# Patient Record
Sex: FEMALE | Race: WHITE | NOT HISPANIC OR LATINO | Employment: FULL TIME | URBAN - METROPOLITAN AREA
[De-identification: names, ages, dates, MRNs, and addresses within clinical notes are randomized per-mention and may not be internally consistent; named-entity substitution may affect disease eponyms.]

---

## 2023-10-03 ENCOUNTER — OFFICE VISIT (OUTPATIENT)
Dept: PRIMARY CARE | Facility: CLINIC | Age: 33
End: 2023-10-03
Payer: COMMERCIAL

## 2023-10-03 VITALS
WEIGHT: 118.38 LBS | BODY MASS INDEX: 19.03 KG/M2 | SYSTOLIC BLOOD PRESSURE: 118 MMHG | HEIGHT: 66 IN | HEART RATE: 58 BPM | DIASTOLIC BLOOD PRESSURE: 70 MMHG | OXYGEN SATURATION: 99 %

## 2023-10-03 DIAGNOSIS — R00.0 TACHYCARDIA: Primary | ICD-10-CM

## 2023-10-03 DIAGNOSIS — Z90.5 SOLITARY KIDNEY, ACQUIRED: ICD-10-CM

## 2023-10-03 DIAGNOSIS — M54.32 SCIATICA, LEFT SIDE: ICD-10-CM

## 2023-10-03 PROBLEM — R10.9 ABDOMINAL PAIN: Status: RESOLVED | Noted: 2023-10-03 | Resolved: 2023-10-03

## 2023-10-03 PROBLEM — R10.9 FLANK PAIN: Status: RESOLVED | Noted: 2023-10-03 | Resolved: 2023-10-03

## 2023-10-03 PROBLEM — R53.83 FATIGUE: Status: ACTIVE | Noted: 2023-10-03

## 2023-10-03 PROBLEM — N63.10 LUMP OF BREAST, RIGHT: Status: ACTIVE | Noted: 2023-10-03

## 2023-10-03 PROBLEM — B07.0 PLANTAR WARTS: Status: ACTIVE | Noted: 2023-10-03

## 2023-10-03 PROBLEM — I83.891 VARICOSE VEINS OF RIGHT LOWER EXTREMITY WITH COMPLICATIONS: Status: ACTIVE | Noted: 2023-10-03

## 2023-10-03 PROBLEM — R87.810 CERVICAL HIGH RISK HPV (HUMAN PAPILLOMAVIRUS) TEST POSITIVE: Status: ACTIVE | Noted: 2023-10-03

## 2023-10-03 PROCEDURE — 90686 IIV4 VACC NO PRSV 0.5 ML IM: CPT | Performed by: FAMILY MEDICINE

## 2023-10-03 PROCEDURE — 1036F TOBACCO NON-USER: CPT | Performed by: FAMILY MEDICINE

## 2023-10-03 PROCEDURE — 99203 OFFICE O/P NEW LOW 30 MIN: CPT | Performed by: FAMILY MEDICINE

## 2023-10-03 PROCEDURE — 90471 IMMUNIZATION ADMIN: CPT | Performed by: FAMILY MEDICINE

## 2023-10-03 RX ORDER — CHOLECALCIFEROL (VITAMIN D3) 50 MCG
TABLET ORAL
COMMUNITY
Start: 2021-04-01

## 2023-10-03 RX ORDER — MISOPROSTOL 200 UG/1
TABLET ORAL
COMMUNITY
Start: 2022-04-26 | End: 2023-10-03 | Stop reason: ALTCHOICE

## 2023-10-03 RX ORDER — METOPROLOL SUCCINATE 25 MG/1
25 TABLET, EXTENDED RELEASE ORAL
COMMUNITY
Start: 2023-08-04

## 2023-10-03 RX ORDER — NORETHINDRONE ACETATE AND ETHINYL ESTRADIOL, ETHINYL ESTRADIOL AND FERROUS FUMARATE 1MG-10(24)
1 KIT ORAL DAILY
COMMUNITY
Start: 2019-06-20 | End: 2023-10-03 | Stop reason: ALTCHOICE

## 2023-10-03 ASSESSMENT — PATIENT HEALTH QUESTIONNAIRE - PHQ9
SUM OF ALL RESPONSES TO PHQ9 QUESTIONS 1 AND 2: 0
2. FEELING DOWN, DEPRESSED OR HOPELESS: NOT AT ALL
1. LITTLE INTEREST OR PLEASURE IN DOING THINGS: NOT AT ALL

## 2023-10-03 ASSESSMENT — ENCOUNTER SYMPTOMS
LOSS OF SENSATION IN FEET: 0
VOMITING: 0
DIFFICULTY URINATING: 0
DEPRESSION: 0
BLOOD IN STOOL: 0
LIGHT-HEADEDNESS: 0
WHEEZING: 0
CONFUSION: 0
FEVER: 0
DIARRHEA: 0
WEAKNESS: 0
NUMBNESS: 0
COUGH: 0
DIZZINESS: 0
TROUBLE SWALLOWING: 0
SHORTNESS OF BREATH: 0
OCCASIONAL FEELINGS OF UNSTEADINESS: 0
ABDOMINAL PAIN: 0
NAUSEA: 0
CHILLS: 0
DYSURIA: 0
UNEXPECTED WEIGHT CHANGE: 0

## 2023-10-03 NOTE — PATIENT INSTRUCTIONS
Please return for your next wellness visit in April 2024, earlier if any question or concern, or need to address the sciatica.      For assistance with scheduling referrals or consultations, please call [phone]. For laboratory, radiology, and other tests, please call [phone] ([phone] for pediatrics). Please review prescription inserts and published information for possible adverse effects of all medications. Return after testing or consultation to review results and recommendations, if symptoms persist, change, worsen, or return, or if you have any question or concern. If you do not get results within 7-10 days, or you have any question or concern, please send a message, call the office ([phone]), or return to the office for a follow-up appointment. For non-emergencies, you may call the office. For emergencies, call 9-1-1 or go to the nearest Emergency Department. Please schedule additional appointment(s) to address concern(s) not addressed today.    In general, results are not released or discussed over the telephone. Results of tests done through [facility] are released via  [employer] (see below).  [email or web address]   [employer] support line: [phone]    Until we complete our transition to the new system, additional information can be found at [email or web address] or on your Android or iOS (iPhone, iPad) device using the [employer] archie available free of charge in your device's archie store.

## 2023-10-03 NOTE — PROGRESS NOTES
"Subjective   Patient ID: Lashawn Guillen is a 33 y.o. female who presents for Establish Care (Pt presents bas a new pt to establish care, having sciatica flare being seen by chiropractor, no refills needed.BL).  HPI    C/o sciatica. Being managed by a chiropractor. Denies weakness, numbness, tingling, weakness in both legs, numbness between the legs (in the \"saddle\" area), loss of bowel or bladder control, or a progressively worse neurological symptom (weakness, numbness, tingling, pain, nerve pain).     Labs last done 4-4-2023 with Dr. Alvarez.      Review of Systems   Constitutional:  Negative for chills, fever and unexpected weight change.   HENT:  Negative for ear pain and trouble swallowing.    Respiratory:  Negative for cough, shortness of breath and wheezing.    Cardiovascular:  Negative for chest pain.   Gastrointestinal:  Negative for abdominal pain, blood in stool, diarrhea, nausea and vomiting.   Genitourinary:  Negative for difficulty urinating and dysuria.   Skin:  Negative for rash.   Neurological:  Negative for dizziness, syncope, weakness, light-headedness and numbness.   Psychiatric/Behavioral:  Negative for behavioral problems and confusion.          Objective   /70   Pulse 58   Ht 1.664 m (5' 5.5\")   Wt 53.7 kg (118 lb 6 oz)   SpO2 99%   BMI 19.40 kg/m²     Physical Exam  Vitals and nursing note reviewed.   Constitutional:       General: She is not in acute distress.     Appearance: She is not diaphoretic.   HENT:      Head: Normocephalic and atraumatic.   Cardiovascular:      Rate and Rhythm: Normal rate and regular rhythm.      Heart sounds: Normal heart sounds.   Pulmonary:      Effort: Pulmonary effort is normal.      Breath sounds: Normal breath sounds.   Abdominal:      General: Bowel sounds are normal. There is no distension.      Palpations: Abdomen is soft. There is no mass.      Tenderness: There is no abdominal tenderness. There is no guarding or rebound.   Musculoskeletal: "      Right lower leg: No edema.      Left lower leg: No edema.   Skin:     General: Skin is warm and dry.   Neurological:      General: No focal deficit present.      Mental Status: She is alert. Mental status is at baseline.   Psychiatric:         Mood and Affect: Mood normal.         Thought Content: Thought content normal.         Assessment/Plan   Problem List Items Addressed This Visit       Tachycardia - Primary     POTS? Continue with cardiology.         Solitary kidney, acquired     Good residual function.         Sciatica, left side

## 2024-04-09 ENCOUNTER — OFFICE VISIT (OUTPATIENT)
Dept: PRIMARY CARE | Facility: CLINIC | Age: 34
End: 2024-04-09
Payer: COMMERCIAL

## 2024-04-09 VITALS
HEIGHT: 65 IN | SYSTOLIC BLOOD PRESSURE: 134 MMHG | TEMPERATURE: 98.9 F | DIASTOLIC BLOOD PRESSURE: 82 MMHG | WEIGHT: 121 LBS | BODY MASS INDEX: 20.16 KG/M2 | HEART RATE: 84 BPM | OXYGEN SATURATION: 100 %

## 2024-04-09 DIAGNOSIS — R00.0 TACHYCARDIA: ICD-10-CM

## 2024-04-09 DIAGNOSIS — Z85.528 HISTORY OF WILMS' TUMOR: ICD-10-CM

## 2024-04-09 DIAGNOSIS — G90.A POTS (POSTURAL ORTHOSTATIC TACHYCARDIA SYNDROME): ICD-10-CM

## 2024-04-09 DIAGNOSIS — Z13.1 DIABETES MELLITUS SCREENING: ICD-10-CM

## 2024-04-09 DIAGNOSIS — Z13.220 NEED FOR LIPID SCREENING: ICD-10-CM

## 2024-04-09 DIAGNOSIS — Z00.00 ANNUAL PHYSICAL EXAM: Primary | ICD-10-CM

## 2024-04-09 PROCEDURE — 99385 PREV VISIT NEW AGE 18-39: CPT | Performed by: STUDENT IN AN ORGANIZED HEALTH CARE EDUCATION/TRAINING PROGRAM

## 2024-04-09 PROCEDURE — 1036F TOBACCO NON-USER: CPT | Performed by: STUDENT IN AN ORGANIZED HEALTH CARE EDUCATION/TRAINING PROGRAM

## 2024-04-09 ASSESSMENT — PATIENT HEALTH QUESTIONNAIRE - PHQ9
SUM OF ALL RESPONSES TO PHQ9 QUESTIONS 1 AND 2: 0
1. LITTLE INTEREST OR PLEASURE IN DOING THINGS: NOT AT ALL
2. FEELING DOWN, DEPRESSED OR HOPELESS: NOT AT ALL

## 2024-04-09 NOTE — PROGRESS NOTES
Subjective   Patient ID: Lashawn Guillen is a 33 y.o. female who presents for Establish Care.  Today she is accompanied by alone.     HPI  1. Health Maintenance  Overall patient is doing well.   Immunization:   -Tdap 2011, unsure if she has updated since then  Colon Cancer Screening: No family history  OB/GYN: Sees Dr. 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  Lives with Boyfriend     Hx of wilms tumor  Right nephrectomy, appendix and partial bowel resection    POTs/tachycardia  - follows cardiologist  - currently on metoprolol 25 mg daily    Sciatica L leg  - follows chiropractor  - under control at this time    Current Outpatient Medications on File Prior to Visit   Medication Sig Dispense Refill    cholecalciferol (Vitamin D-3) 50 MCG (2000 UT) tablet Vitamin D 50 MCG (2000 UT) Oral Tablet   Refills: 0        Start : 1-Apr-2021   Active      metoprolol succinate XL (Toprol-XL) 25 mg 24 hr tablet Take 1 tablet (25 mg) by mouth once daily with breakfast.      ZINC-VIT C-PYRIDOXINE, VIT B6, ORAL Use 1 tablet in the mouth or throat once daily.       No current facility-administered medications on file prior to visit.        Allergies   Allergen Reactions    Meperidine Swelling and Unknown    Nitrofurantoin Monohyd/M-Cryst Unknown       Immunization History   Administered Date(s) Administered    Flu vaccine (IIV4), preservative free *Check age/dose* 10/26/2022, 10/03/2023    Moderna SARS-CoV-2 Vaccination 12/30/2020, 01/27/2021, 11/18/2021    Tdap vaccine, age 7 year and older (BOOSTRIX, ADACEL) 07/12/2011         Review of Systems  All pertinent positive symptoms are included in the history of present illness.  All other systems have been reviewed and are negative and noncontributory to this patient's current ailments.     Objective   /82   Pulse 84   Temp 37.2 °C (98.9 °F)   Ht 1.651 m (5'  "5\")   Wt 54.9 kg (121 lb)   SpO2 100%   BMI 20.14 kg/m²     Physical Exam  CONSTITUTIONAL - well nourished, well developed, looks like stated age, in no acute distress, not ill-appearing, and not tired appearing  SKIN - normal skin color and pigmentation, normal skin turgor without rash, lesions, or nodules visualized  HEAD - no trauma, normocephalic  EYES - pupils are equal and reactive to light, extraocular muscles are intact, and normal external exam  ENT - TM's intact, no injection, no signs of infection, uvula midline, normal tongue movement and throat normal, no exudate, nasal passage without discharge and patent  NECK - supple without rigidity, no neck mass was observed,   LUNG - clear to auscultation, no wheezing, no crackles and no rales, good effort  CARDIAC - regular rate and regular rhythm, no skipped beats, no murmur  ABDOMEN - no organomegaly, soft, nontender, nondistended, normal bowel sounds  EXTREMITIES - no edema, no deformities  NEUROLOGICAL - normal gait, normal balance, normal motor, alert, oriented and no focal signs  PSYCHIATRIC - alert, pleasant and cordial, age-appropriate    Assessment/Plan   1. Health maintenance  Overall patient is doing well.    Complete history and physical examination was performed   Lab work was ordered and will notify of test results and make recommendations accordingly  Colon Cancer screening due at age 44  Please attempt to eat a well-balanced diet and exercise regularly    2. Hx of wilms tumor  Will continue to monitor    3, POTs/tachycardia  Continue metoprolol 25 mg daily and following with your cardiologist as scheduled.     Patient seen and discussed with attending physician, Dr Mic Smart DO  PGY 1  Family Medicine  "

## 2024-04-09 NOTE — PROGRESS NOTES
"Subjective   Patient ID: Lashawn Guillen is a 33 y.o. female with past medical history of who presents for Establish Care.  HPI    Review of Systems    Objective     Visit Vitals  /82   Pulse 84   Temp 37.2 °C (98.9 °F)   Ht 1.651 m (5' 5\")   Wt 54.9 kg (121 lb)   SpO2 100%   BMI 20.14 kg/m²   OB Status Implant   Smoking Status Never   BSA 1.59 m²         Physical Exam    Assessment/Plan   Problem List Items Addressed This Visit    None      Patient seen and discussed with attending physician, Dr Mic Garner, DO PGY2  Family Medicine      "

## 2024-04-16 DIAGNOSIS — B00.1 COLD SORE: Primary | ICD-10-CM

## 2024-04-16 RX ORDER — ACYCLOVIR 50 MG/G
1 OINTMENT TOPICAL
Qty: 15 G | Refills: 0 | Status: SHIPPED | OUTPATIENT
Start: 2024-04-16

## 2024-04-20 ENCOUNTER — LAB (OUTPATIENT)
Dept: LAB | Facility: LAB | Age: 34
End: 2024-04-20
Payer: COMMERCIAL

## 2024-04-20 DIAGNOSIS — Z00.00 ANNUAL PHYSICAL EXAM: ICD-10-CM

## 2024-04-20 DIAGNOSIS — Z13.220 NEED FOR LIPID SCREENING: ICD-10-CM

## 2024-04-20 DIAGNOSIS — R00.0 TACHYCARDIA: ICD-10-CM

## 2024-04-20 DIAGNOSIS — Z13.1 DIABETES MELLITUS SCREENING: ICD-10-CM

## 2024-04-20 LAB
ALBUMIN SERPL BCP-MCNC: 4.7 G/DL (ref 3.4–5)
ALP SERPL-CCNC: 55 U/L (ref 33–110)
ALT SERPL W P-5'-P-CCNC: 11 U/L (ref 7–45)
ANION GAP SERPL CALC-SCNC: 14 MMOL/L (ref 10–20)
AST SERPL W P-5'-P-CCNC: 15 U/L (ref 9–39)
BILIRUB SERPL-MCNC: 0.8 MG/DL (ref 0–1.2)
BUN SERPL-MCNC: 18 MG/DL (ref 6–23)
CALCIUM SERPL-MCNC: 9.4 MG/DL (ref 8.6–10.3)
CHLORIDE SERPL-SCNC: 102 MMOL/L (ref 98–107)
CHOLEST SERPL-MCNC: 158 MG/DL (ref 0–199)
CHOLESTEROL/HDL RATIO: 2.4
CO2 SERPL-SCNC: 28 MMOL/L (ref 21–32)
CREAT SERPL-MCNC: 0.75 MG/DL (ref 0.5–1.05)
EGFRCR SERPLBLD CKD-EPI 2021: >90 ML/MIN/1.73M*2
ERYTHROCYTE [DISTWIDTH] IN BLOOD BY AUTOMATED COUNT: 12.5 % (ref 11.5–14.5)
EST. AVERAGE GLUCOSE BLD GHB EST-MCNC: 88 MG/DL
GLUCOSE SERPL-MCNC: 75 MG/DL (ref 74–99)
HBA1C MFR BLD: 4.7 %
HCT VFR BLD AUTO: 45 % (ref 36–46)
HDLC SERPL-MCNC: 66.8 MG/DL
HGB BLD-MCNC: 15.2 G/DL (ref 12–16)
LDLC SERPL CALC-MCNC: 80 MG/DL
MCH RBC QN AUTO: 30.7 PG (ref 26–34)
MCHC RBC AUTO-ENTMCNC: 33.8 G/DL (ref 32–36)
MCV RBC AUTO: 91 FL (ref 80–100)
NON HDL CHOLESTEROL: 91 MG/DL (ref 0–149)
NRBC BLD-RTO: 0 /100 WBCS (ref 0–0)
PLATELET # BLD AUTO: 294 X10*3/UL (ref 150–450)
POTASSIUM SERPL-SCNC: 4.5 MMOL/L (ref 3.5–5.3)
PROT SERPL-MCNC: 6.9 G/DL (ref 6.4–8.2)
RBC # BLD AUTO: 4.95 X10*6/UL (ref 4–5.2)
SODIUM SERPL-SCNC: 139 MMOL/L (ref 136–145)
TRIGL SERPL-MCNC: 56 MG/DL (ref 0–149)
TSH SERPL-ACNC: 2.22 MIU/L (ref 0.44–3.98)
VLDL: 11 MG/DL (ref 0–40)
WBC # BLD AUTO: 6.4 X10*3/UL (ref 4.4–11.3)

## 2024-04-20 PROCEDURE — 85027 COMPLETE CBC AUTOMATED: CPT

## 2024-04-20 PROCEDURE — 84443 ASSAY THYROID STIM HORMONE: CPT

## 2024-04-20 PROCEDURE — 80061 LIPID PANEL: CPT

## 2024-04-20 PROCEDURE — 36415 COLL VENOUS BLD VENIPUNCTURE: CPT

## 2024-04-20 PROCEDURE — 80053 COMPREHEN METABOLIC PANEL: CPT

## 2024-04-20 PROCEDURE — 83036 HEMOGLOBIN GLYCOSYLATED A1C: CPT

## 2024-06-05 ENCOUNTER — OFFICE VISIT (OUTPATIENT)
Dept: OBSTETRICS AND GYNECOLOGY | Facility: CLINIC | Age: 34
End: 2024-06-05
Payer: COMMERCIAL

## 2024-06-05 VITALS
HEIGHT: 65 IN | BODY MASS INDEX: 20.49 KG/M2 | DIASTOLIC BLOOD PRESSURE: 68 MMHG | WEIGHT: 123 LBS | SYSTOLIC BLOOD PRESSURE: 110 MMHG

## 2024-06-05 DIAGNOSIS — Z12.4 CERVICAL CANCER SCREENING: ICD-10-CM

## 2024-06-05 DIAGNOSIS — Z01.419 WELL WOMAN EXAM: Primary | ICD-10-CM

## 2024-06-05 PROCEDURE — 99395 PREV VISIT EST AGE 18-39: CPT | Performed by: OBSTETRICS & GYNECOLOGY

## 2024-06-05 PROCEDURE — 88175 CYTOPATH C/V AUTO FLUID REDO: CPT

## 2024-06-05 PROCEDURE — 87624 HPV HI-RISK TYP POOLED RSLT: CPT

## 2024-06-05 NOTE — PROGRESS NOTES
Subjective   Patient ID: Lashawn Guillen is a 33 y.o. female who presents for Well woman visit.  Review of my note from annual exam 1 year ago.  Ended up having colposcopy for LGSIL positive HPV.       Shivam Dean MD  Physician  Specialty: Obstetrics and Gynecology     Progress Notes     Signed     Encounter Date: 5/10/2023    Signed           Orders     · PAP GYN, Cytology; Status:In Progress - Specimen/Data Collected,Retrospective  Authorization;   Done: 10May2023  Last Menstrual Period (LMP): : Kyleena IUD  PAP - Site : CERVICAL  Cytology Order : ThinPrep PAP, Screening, HPV CoTest - Include Genotyping     · IO UA (nonautomated w/o microscopy); Status:Resulted - Requires  Verification,Retrospective Authorization;   Done: 10May2023 03:31PM     Patient Discussion/Summary     Established patient annual exam 32 years old. 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 Last she had a Pap with positive HPV     Has a Kyleena in place. History Wilms tumor. Pregnancy is contraindicated based on her past history     Dating Baljit Corona who is a PA in the emergency room     Exam breast abdominal pelvic exams normal.     She did have a mammogram at age 29 they found a fibroadenoma.     She does have random intermittent spotting which I reassured her is a common side effect of the Kyleena. Generally leads to much less bleeding and cycles but it can be unpredictable. Kyleena is good till 2027     On exam all normal. Pap smear obtained. Will notify her of any abnormalities on Pap otherwise follow-up 1 year      Chief Complaint     Here for annual exam.      Review of Systems  Constitutional: no fever,~no chills,~no recent weight gain,~no recent weight loss~and~no fatigue.   Eyes: no eye pain,~no vision problems~and~no dryness of the eyes.   ENT: no hearing loss,~no nosebleeds~and~no sinus congestion.   Cardiovascular: no chest pain,~no palpitations~and~no orthopnea.   Respiratory: no shortness of breath,~no cough~and~no  wheezing.   Gastrointestinal: no abdominal pain,~no constipation,~no nausea,~no diarrhea~and~no vomiting.   Genitourinary: no dysuria,~no urinary incontinence,~no vaginal dryness,~no vaginal itching,~no dyspareunia,~no pelvic pain,~no dysmenorrhea,~no sexual problems,~no change in urinary frequency,~no vaginal discharge,~no unexplained vaginal bleeding~and~no lesion/sore.   Musculoskeletal: no back pain,~no joint swelling~and~no leg edema.   Integumentary: no rashes,~no skin lesions,~no nipple discharge,~no breast pain~and~no breast lump.   Neurological: no headache,~no numbness~and~no dizziness.   Psychiatric: no sleep disturbances,~no anxiety~and~no depression.   Endocrine: no hot flashes,~no loss of hair~and~no hirsutism.   Hematologic/Lymphatic: no swollen glands,~no tendency for easy bleeding~and~no tendency for easy bruising.   All other systems have been reviewed and are negative for complaint.      Active Problems  Problems    · Abdominal pain (789.00) (R10.9)   · Breast lump (611.72) (N63.0)   · Cervical high risk HPV (human papillomavirus) test positive (795.05) (R87.810)   · Cervical smear, as part of routine gynecological examination (V76.2) (Z01.419)   · Contraceptive management (V25.9) (Z30.9)   · Encounter for fertility planning (V26.89) (Z31.89)   · Encounter for gynecological examination with abnormal finding (V72.31) (Z01.411)   · Encounter for gynecological examination without abnormal finding (V72.31) (Z01.419)   · Encounter for routine gynecological examination (V72.31) (Z01.419)   2011-normal   · Encounter for surveillance of contraceptive pills (V25.41) (Z30.41)   · Fatigue (780.79) (R53.83)   · Fertility testing (V26.21) (Z31.41)   · Flank pain (789.09) (R10.9)   · Lump of breast, right (611.72) (N63.10)   · Plantar warts (078.12) (B07.0)   · Varicose veins of right lower extremity with complications (454.8) (I83.891)   · Visit for insertion of intrauterine device (V25.11) (Z30.430)     Past  Medical History  Problems    · 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)     Surgical History  Problems    · History of Appendectomy   · History of Biopsy Skin   · benign   · History of Colon Surgery   1.5 feet removed due to adhesions   · History of Eye Surgery   teadr duct blocked   · History of Kidney Surgery   had a wilms tumor on right kidney, right kidney was removed      Family History  Mother    · Family history of Fibrocystic Disease Of Breast   · Family history of Osteoarthritis (V17.7)  Father    · Family history of Rheumatoid Arthritis  Paternal Grandmother    · Family history of Brain Cancer (V16.8)  Maternal Grandfather    · Family history of Osteoarthritis (V17.7)  Paternal Grandfather    · Family history of Diabetes Mellitus (V18.0)     Social History  Problems    · Always uses seat belt   · Being A Social Drinker   · Marital History - Currently    · Never a smoker   · Work History   [employer] in [place]     Allergies  Medication    · Demerol SOLN   Allergy; Swelling; 1993; Updated By: Kathy Gonzalez; 2013 10:39:06 AM  swelling all over   · Macrobid CAPS   Recorded By: Talisha Chavez; 2019 12:40:21 PM     Current Meds       Medication Name Instruction  Lo Loestrin Fe 1 MG-10 MCG / 10 MCG Oral Tablet TAKE 1 TABLET DAILY AS DIRECTED.  miSOPROStol 200 MCG Oral Tablet 200 mcg cytotec po night before procedure  Vitamin C Plus 500 MG TABS    Vitamin D 50 MCG (2000) Oral Tablet       Vitals  Vital Signs       Recorded: [id number] 03:31PM  Systolic 122  Diastolic 78  Height 5 ft 5 in  Weight 117 lb   BMI Calculated 19.47 kg/m2  BSA Calculated 1.58  LMP Kyleena iud   0     Physical Exam     Constitutional: Alert and in no acute distress. Well developed, well nourished   Head and Face: Head and face: normal   Eyes: Normal external exam - nonicteric sclera, extraocular movements intact (EOMI) and no ptosis.   Ears, Nose, Mouth, and Throat: External inspection of  ears and nose: normal   Neck: no neck asymmetry. Supple~and~thyroid not enlarged and there were no palpable thyroid nodules   Cardiovascular: Heart rate and rhythm were normal, normal S1 and S2, no gallops, and no murmurs   Pulmonary: No respiratory distress~and~clear bilateral breath sounds   Chest: Breasts: normal appearance, no nipple discharge and no skin changes~and~palpation of breasts and axillae: no palpable mass and no axillary lymphadenopathy   Abdomen: soft nontender; no abdominal mass palpated,~no organomegaly~and~no hernias   Genitourinary: external genitalia: normal,~no inguinal lymphadenopathy,~Bartholin's urethral and Elco's glands: normal,~urethra: normal,~bladder: normal on palpation~and~perianal area: normal   Vagina: normal.   Cervix: Normal.   Uterus: Normal.   Right Adnexa/parametria: Normal.   Left Adnexa/parametria: Normal.   Musculoskeletal: no joint swelling seen, normal movements of all extremities   Skin: normal skin color and pigmentation, normal skin turgor, and no rash.   Neurologic: cranial nerves: non-focal. grossly intact   Psychiatric: alert and oriented x 3.,~affect normal to patient baseline~and~mood: appropriate      Results/Data    IO UA (nonautomated w/o microscopy) 10May2023 03:31PM Shivam Dean       Test Name Result Flag Reference  IO Glucose - Urine Negative      IO Blood Negative      IO Protein, Urine Negative      IO Nitrite, Urine Negative      IO Leukocytes Negative      IO Glucose - Urine Negative      IO Blood Negative      IO Protein, Urine Negative      IO Nitrite, Urine Negative      IO Leukocytes Negative                     Signatures   Electronically signed by : Shivam Dean MD; May 10 2023  3:49PM EST (Author)               Last signed by: Shivam Dean MD at 5/10/2023  3:49 PM    Established patient annual exam.  33 years old.  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  [place] till 2027.  Occasional  minimal spotting.  Last year she had an abnormal Pap we reviewed it.    Relationship status is unchanged.  Her [place] been dating for 4-1/2 years.    Exam breast abdominal pelvic exams normal.  Pap smear obtained.  IUD thread seen    Well woman exam.  Will triage based on Pap smear.  Otherwise follow-up 1 year        Review of Systems   All other systems reviewed and are negative.      Objective   Physical Exam  Constitutional:       Appearance: Normal appearance. She is normal weight.   Chest:   Breasts:     Right: Normal.      Left: Normal.   Genitourinary:     General: Normal vulva.      Vagina: Normal.      Cervix: Normal.      Uterus: Normal.       Adnexa: Right adnexa normal and left adnexa normal.   Neurological:      Mental Status: She is alert.         Assessment/Plan   Well woman exam.  Pap smear obtained.  History LGSIL Pap.  Kyleena good till 2027.  Triage based on Pap results.  Otherwise follow-up 1 year         Shivam Dean MD 06/05/24 3:58 PM

## 2024-06-17 LAB
CYTOLOGY CMNT CVX/VAG CYTO-IMP: NORMAL
HPV HR 12 DNA GENITAL QL NAA+PROBE: POSITIVE
HPV HR GENOTYPES PNL CVX NAA+PROBE: POSITIVE
HPV16 DNA SPEC QL NAA+PROBE: NEGATIVE
HPV18 DNA SPEC QL NAA+PROBE: NEGATIVE
LAB AP CONTRACEPTIVE HISTORY: NORMAL
LAB AP HPV GENOTYPE QUESTION: YES
LAB AP HPV HR: NORMAL
LABORATORY COMMENT REPORT: NORMAL
LMP START DATE: NORMAL
PATH REPORT.TOTAL CANCER: NORMAL

## 2024-06-21 ENCOUNTER — PROCEDURE VISIT (OUTPATIENT)
Dept: OBSTETRICS AND GYNECOLOGY | Facility: CLINIC | Age: 34
End: 2024-06-21
Payer: COMMERCIAL

## 2024-06-21 VITALS
SYSTOLIC BLOOD PRESSURE: 112 MMHG | DIASTOLIC BLOOD PRESSURE: 62 MMHG | WEIGHT: 122 LBS | BODY MASS INDEX: 20.33 KG/M2 | HEIGHT: 65 IN

## 2024-06-21 DIAGNOSIS — R87.612 LGSIL ON PAP SMEAR OF CERVIX: Primary | ICD-10-CM

## 2024-06-21 PROCEDURE — 88142 CYTOPATH C/V THIN LAYER: CPT

## 2024-06-21 PROCEDURE — 57456 ENDOCERV CURETTAGE W/SCOPE: CPT | Performed by: OBSTETRICS & GYNECOLOGY

## 2024-06-21 PROCEDURE — 87624 HPV HI-RISK TYP POOLED RSLT: CPT

## 2024-06-21 NOTE — PROGRESS NOTES
Patient ID: Lashawn Guillen is a 33 y.o. female.    Colposcopy    Date/Time: 6/21/2024 9:38 AM    Performed by: Shivam Dean MD  Authorized by: Shivam Dean MD    Consent:     Patient questions answered: yes      Risks and benefits of the procedure and its alternatives discussed: yes      Consent obtained:  Verbal    Consent given by:  Patient  Pre-procedure:     Prep solution(s): acetic acid    Procedure:     Colposcopy with: endocervical curettage      Cervix visibility: fully visualized      SCJ visibility: fully visualized    Post-procedure:     Patient tolerance of procedure:  Patient tolerated the procedure well with no immediate complications  Comments:      Recently been told she is positive for a gene that puts her at risk for cardiomyopathy.  Her mother has left ventricular dysfunction/compaction.  Patient has 1 kidney.  Somewhat emotional today with all these medical issues.  Reassurance given

## 2024-07-01 LAB
CYTOLOGY CMNT CVX/VAG CYTO-IMP: NORMAL
HPV HR 12 DNA GENITAL QL NAA+PROBE: POSITIVE
HPV HR GENOTYPES PNL CVX NAA+PROBE: POSITIVE
HPV16 DNA SPEC QL NAA+PROBE: NEGATIVE
HPV18 DNA SPEC QL NAA+PROBE: NEGATIVE
LAB AP CONTRACEPTIVE HISTORY: NORMAL
LAB AP HPV GENOTYPE QUESTION: YES
LAB AP HPV HR: NORMAL
LABORATORY COMMENT REPORT: NORMAL
LABORATORY COMMENT REPORT: NORMAL
LMP START DATE: NORMAL
PATH REPORT.TOTAL CANCER: NORMAL

## 2025-04-15 ENCOUNTER — APPOINTMENT (OUTPATIENT)
Dept: PRIMARY CARE | Facility: CLINIC | Age: 35
End: 2025-04-15
Payer: COMMERCIAL

## 2025-04-15 VITALS
SYSTOLIC BLOOD PRESSURE: 102 MMHG | BODY MASS INDEX: 21.16 KG/M2 | HEART RATE: 80 BPM | HEIGHT: 65 IN | WEIGHT: 127 LBS | OXYGEN SATURATION: 96 % | DIASTOLIC BLOOD PRESSURE: 72 MMHG

## 2025-04-15 DIAGNOSIS — Z15.89 GENETIC SUSCEPTIBILITY TO CARDIOMYOPATHY: ICD-10-CM

## 2025-04-15 DIAGNOSIS — Z00.00 ANNUAL WELLNESS VISIT: Primary | ICD-10-CM

## 2025-04-15 DIAGNOSIS — G90.A POTS (POSTURAL ORTHOSTATIC TACHYCARDIA SYNDROME): ICD-10-CM

## 2025-04-15 PROCEDURE — 1036F TOBACCO NON-USER: CPT | Performed by: STUDENT IN AN ORGANIZED HEALTH CARE EDUCATION/TRAINING PROGRAM

## 2025-04-15 PROCEDURE — 99395 PREV VISIT EST AGE 18-39: CPT | Performed by: STUDENT IN AN ORGANIZED HEALTH CARE EDUCATION/TRAINING PROGRAM

## 2025-04-15 PROCEDURE — 3008F BODY MASS INDEX DOCD: CPT | Performed by: STUDENT IN AN ORGANIZED HEALTH CARE EDUCATION/TRAINING PROGRAM

## 2025-04-15 ASSESSMENT — PATIENT HEALTH QUESTIONNAIRE - PHQ9
1. LITTLE INTEREST OR PLEASURE IN DOING THINGS: NOT AT ALL
SUM OF ALL RESPONSES TO PHQ9 QUESTIONS 1 AND 2: 0
2. FEELING DOWN, DEPRESSED OR HOPELESS: NOT AT ALL

## 2025-04-15 NOTE — PROGRESS NOTES
"Subjective   Lashawn Guillen is a 34 y.o. female who presents for annual health maintenance.    HPI  Health Maintenance:   Pap: last done June 2024 with a colposcopy. She has HPV and follows with OB/GYN.  Mammogram: last done 2021, negative. Repeat in 5 years.  Colonoscopy: not yet indicated. No family history of colon cancer.  Immunizations: Due for tdap. Does not want today.  Sees dentist regularly and not eye doctor.  Diet: home cooked meals.   Exercise: 5 days/week - weights and cardio.  EtOH use: socially.  Non-smoker, never.  Denies other drug or substance use.  Does not need any refills today.    POTS  Takes metoprolol succinate 25 mg daily. Tolerates well and denies any adverse effects.  Her main symptoms are black outs if she stands up too quickly, fatiguing quickly, and having a difficult time catching her breath.  Follows with cardiology, Dr. Lukas Horton.    Family history of dilated LV non-compaction cardiomyopathy; MYH7 gene (+)  Follows with cardiology, Dr. Lukas Horton. Stays active and eats a healthy balanced diet.    ROS:   All pertinent positive symptoms are included in the history of present illness.  All other systems have been reviewed and are negative and noncontributory to this patient's current ailments.    Objective     /72   Pulse 80   Ht 1.651 m (5' 5\")   Wt 57.6 kg (127 lb)   SpO2 96%   BMI 21.13 kg/m²     Physical Exam   CONSTITUTIONAL - well nourished, well developed, looks like stated age, in no acute distress, not ill-appearing, and not tired appearing  SKIN - normal skin color and pigmentation, normal skin turgor without rash, lesions, or nodules visualized  HEAD - no trauma, normocephalic  EYES - extraocular muscles are intact, and normal external exam  ENT - TM's intact, no injection, no signs of infection, uvula midline, normal tongue movement and throat normal, no exudate  NECK - supple without rigidity, no neck mass was observed, no thyromegaly " or thyroid nodules  CHEST - clear to auscultation, no wheezing, no crackles and no rales, good effort  CARDIAC - regular rate and regular rhythm, no skipped beats, no murmur  ABDOMEN - no organomegaly, soft, nontender, nondistended, normal bowel sounds, no guarding/rebound/rigidity, negative McBurney sign and negative Oconnell sign  EXTREMITIES - no obvious or evident edema, no obvious or evident deformities  NEUROLOGICAL - normal gait, normal balance, normal motor, no ataxia, alert, oriented and no focal signs  PSYCHIATRIC - alert, pleasant and cordial, age-appropriate  IMMUNOLOGIC - no cervical lymphadenopathy    Assessment/Plan   Diagnoses and all orders for this visit:  Annual wellness visit  Comments:  Complete physical exam performed  Discussed and recommended age-appropriate vaccinations and screenings  Annual labs ordered  Orders:  -     CBC and Auto Differential; Future  -     Comprehensive Metabolic Panel; Future  -     TSH with reflex to Free T4 if abnormal; Future  -     Lipid Panel; Future  -     Hemoglobin A1C; Future  POTS (postural orthostatic tachycardia syndrome)  Comments:  Stable on metoprolol   Continue to follow with cardiology  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

## 2025-06-13 ENCOUNTER — APPOINTMENT (OUTPATIENT)
Dept: OBSTETRICS AND GYNECOLOGY | Facility: CLINIC | Age: 35
End: 2025-06-13
Payer: COMMERCIAL

## 2025-07-02 ENCOUNTER — APPOINTMENT (OUTPATIENT)
Dept: OBSTETRICS AND GYNECOLOGY | Facility: CLINIC | Age: 35
End: 2025-07-02
Payer: COMMERCIAL

## 2025-07-02 VITALS
WEIGHT: 125 LBS | HEIGHT: 65 IN | SYSTOLIC BLOOD PRESSURE: 124 MMHG | BODY MASS INDEX: 20.83 KG/M2 | DIASTOLIC BLOOD PRESSURE: 60 MMHG

## 2025-07-02 DIAGNOSIS — Z01.419 WELL WOMAN EXAM: ICD-10-CM

## 2025-07-02 PROCEDURE — 99395 PREV VISIT EST AGE 18-39: CPT | Performed by: OBSTETRICS & GYNECOLOGY

## 2025-07-02 PROCEDURE — 3008F BODY MASS INDEX DOCD: CPT | Performed by: OBSTETRICS & GYNECOLOGY

## 2025-07-02 PROCEDURE — 87626 HPV SEP HI-RSK TYP&POOL RSLT: CPT

## 2025-07-02 PROCEDURE — 88141 CYTOPATH C/V INTERPRET: CPT | Performed by: PATHOLOGY

## 2025-07-02 PROCEDURE — 88175 CYTOPATH C/V AUTO FLUID REDO: CPT

## 2025-07-02 NOTE — PROGRESS NOTES
Subjective   Patient ID: Lashawn Guillen is a 34 y.o. female who presents for well woman exam.  Here for annual exam, review of last year's note.  She had a colposcopy after an abnormal Pap.       Shivam Dean MD  Physician  Obstetrics     Progress Notes     Signed     Encounter Date: 6/5/2024    Signed     Expand All Collapse All    Subjective  Patient ID: Lashawn Guillen is a 33 y.o. female who presents for Well woman visit.  Review of my note from annual exam 1 year ago.  Ended up having colposcopy for LGSIL positive HPV.        Shivam Dean MD  Physician  Specialty: Obstetrics and Gynecology     Progress Notes     Signed     Encounter Date: 5/10/2023     Signed             Orders     · PAP GYN, Cytology; Status:In Progress - Specimen/Data Collected,Retrospective  Authorization;   Done: 10May2023  Last Menstrual Period (LMP): : Kyleena IUD  PAP - Site : CERVICAL  Cytology Order : ThinPrep PAP, Screening, HPV CoTest - Include Genotyping     · IO UA (nonautomated w/o microscopy); Status:Resulted - Requires  Verification,Retrospective Authorization;   Done: 10May2023 03:31PM     Patient Discussion/Summary     Established patient annual exam 32 years old. 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 Last she had a Pap with positive HPV     Has a Kyleena in place. History Wilms tumor. Pregnancy is contraindicated based on her past history     Dating Baljit Corona who is a PA in the emergency room     Exam breast abdominal pelvic exams normal.     She did have a mammogram at age 29 they found a fibroadenoma.     She does have random intermittent spotting which I reassured her is a common side effect of the Kyleena. Generally leads to much less bleeding and cycles but it can be unpredictable. Kyleena is good till 2027     On exam all normal. Pap smear obtained. Will notify her of any abnormalities on Pap otherwise follow-up 1 year      Chief Complaint     Here for annual exam.      Review of  Systems  Constitutional: no fever,~no chills,~no recent weight gain,~no recent weight loss~and~no fatigue.   Eyes: no eye pain,~no vision problems~and~no dryness of the eyes.   ENT: no hearing loss,~no nosebleeds~and~no sinus congestion.   Cardiovascular: no chest pain,~no palpitations~and~no orthopnea.   Respiratory: no shortness of breath,~no cough~and~no wheezing.   Gastrointestinal: no abdominal pain,~no constipation,~no nausea,~no diarrhea~and~no vomiting.   Genitourinary: no dysuria,~no urinary incontinence,~no vaginal dryness,~no vaginal itching,~no dyspareunia,~no pelvic pain,~no dysmenorrhea,~no sexual problems,~no change in urinary frequency,~no vaginal discharge,~no unexplained vaginal bleeding~and~no lesion/sore.   Musculoskeletal: no back pain,~no joint swelling~and~no leg edema.   Integumentary: no rashes,~no skin lesions,~no nipple discharge,~no breast pain~and~no breast lump.   Neurological: no headache,~no numbness~and~no dizziness.   Psychiatric: no sleep disturbances,~no anxiety~and~no depression.   Endocrine: no hot flashes,~no loss of hair~and~no hirsutism.   Hematologic/Lymphatic: no swollen glands,~no tendency for easy bleeding~and~no tendency for easy bruising.   All other systems have been reviewed and are negative for complaint.      Active Problems  Problems    · Abdominal pain (789.00) (R10.9)   · Breast lump (611.72) (N63.0)   · Cervical high risk HPV (human papillomavirus) test positive (795.05) (R87.810)   · Cervical smear, as part of routine gynecological examination (V76.2) (Z01.419)   · Contraceptive management (V25.9) (Z30.9)   · Encounter for fertility planning (V26.89) (Z31.89)   · Encounter for gynecological examination with abnormal finding (V72.31) (Z01.411)   · Encounter for gynecological examination without abnormal finding (V72.31) (Z01.419)   · Encounter for routine gynecological examination (V72.31) (Z01.419)   2011-normal   · Encounter for surveillance of contraceptive  pills (V25.41) (Z30.41)   · Fatigue (780.79) (R53.83)   · Fertility testing (V26.21) (Z31.41)   · Flank pain (789.09) (R10.9)   · Lump of breast, right (611.72) (N63.10)   · Plantar warts (078.12) (B07.0)   · Varicose veins of right lower extremity with complications (454.8) (I83.891)   · Visit for insertion of intrauterine device (V25.11) (Z30.430)     Past Medical History  Problems    · 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)     Surgical History  Problems    · History of Appendectomy   · History of Biopsy Skin   · benign   · History of Colon Surgery   1.5 feet removed due to adhesions   · History of Eye Surgery   teadr duct blocked   · History of Kidney Surgery   had a wilms tumor on right kidney, right kidney was removed 1992     Family History  Mother    · Family history of Fibrocystic Disease Of Breast   · Family history of Osteoarthritis (V17.7)  Father    · Family history of Rheumatoid Arthritis  Paternal Grandmother    · Family history of Brain Cancer (V16.8)  Maternal Grandfather    · Family history of Osteoarthritis (V17.7)  Paternal Grandfather    · Family history of Diabetes Mellitus (V18.0)     Social History  Problems    · Always uses seat belt   · Being A Social Drinker   · Marital History - Currently    · Never a smoker   · Work History   [employer] in [place]     Allergies  Medication    · Demerol SOLN   Allergy; Swelling; 01 Jan 1993; Updated By: Kathy Gonzalez; 8/9/2013 10:39:06 AM  swelling all over   · Macrobid CAPS   Recorded By: Talisha Chavez; 12/14/2019 12:40:21 PM     Current Meds        Medication NameInstruction  Lo Loestrin Fe 1 MG-10 MCG / 10 MCG Oral TabletTAKE 1 TABLET DAILY AS DIRECTED.  miSOPROStol 200 MCG Oral Vpxtcf707 mcg cytotec po night before procedure  Vitamin C Plus 500 MG TABS     Vitamin D 50 MCG (2000 UT) Oral Tablet             Vitals  Vital Signs                  Recorded: 10May2023 03:31PM  [id number]  [id number]  Height5 ft 5 in  Weight 117 lb    BMI Ileinjtarz24.47 kg/m2  BSA Calculated1.58  LMPKyleena iud  Gravida0     Physical Exam     Constitutional: Alert and in no acute distress. Well developed, well nourished   Head and Face: Head and face: normal   Eyes: Normal external exam - nonicteric sclera, extraocular movements intact (EOMI) and no ptosis.   Ears, Nose, Mouth, and Throat: External inspection of ears and nose: normal   Neck: no neck asymmetry. Supple~and~thyroid not enlarged and there were no palpable thyroid nodules   Cardiovascular: Heart rate and rhythm were normal, normal S1 and S2, no gallops, and no murmurs   Pulmonary: No respiratory distress~and~clear bilateral breath sounds   Chest: Breasts: normal appearance, no nipple discharge and no skin changes~and~palpation of breasts and axillae: no palpable mass and no axillary lymphadenopathy   Abdomen: soft nontender; no abdominal mass palpated,~no organomegaly~and~no hernias   Genitourinary: external genitalia: normal,~no inguinal lymphadenopathy,~Bartholin's urethral and [place]'s glands: normal,~urethra: normal,~bladder: normal on palpation~and~perianal area: normal   Vagina: normal.   Cervix: Normal.   Uterus: Normal.   Right Adnexa/parametria: Normal.   Left Adnexa/parametria: Normal.   Musculoskeletal: no joint swelling seen, normal movements of all extremities   Skin: normal skin color and pigmentation, normal skin turgor, and no rash.   Neurologic: cranial nerves: non-focal. grossly intact   Psychiatric: alert and oriented x 3.,~affect normal to patient baseline~and~mood: appropriate      Results/Data     IO UA (nonautomated w/o microscopy)[id number] 03:31PShivam Bai        Test NameResultFlagReference  IO Glucose - Urine       Negative                         IO Blood          Negative                         IO Protein, Urine          Negative                         IO Nitrite, Urine           Negative                         IO Leukocytes  Negative                         IO  Glucose - Urine       Negative                         IO Blood          Negative                         IO Protein, Urine          Negative                         IO Nitrite, Urine           Negative                         IO Leukocytes  Negative                                                                      Signatures   Electronically signed by : Shivam Dean MD; May 10 2023  3:49PM EST (Author)                    Last signed by: Shivam Dean MD at 5/10/2023  3:49 PM     Established patient annual exam.  33 years old.  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  Anne good till 2027.  Occasional minimal spotting.  Last year she had an abnormal Pap we reviewed it.     Relationship status is unchanged.  Her [place] been dating for 4-1/2 years.     Exam breast abdominal pelvic exams normal.  Pap smear obtained.  IUD thread seen     Well woman exam.  Will triage based on Pap smear.  Otherwise follow-up 1 year           Review of Systems   All other systems reviewed and are negative.        Objective  Physical Exam  Constitutional:       Appearance: Normal appearance. She is normal weight.   Chest:   Breasts:     Right: Normal.      Left: Normal.   Genitourinary:     General: Normal vulva.      Vagina: Normal.      Cervix: Normal.      Uterus: Normal.       Adnexa: Right adnexa normal and left adnexa normal.   Neurological:      Mental Status: She is alert.            Assessment/Plan  Well woman exam.  Pap smear obtained.  History LGSIL Pap.  Kyleena good till 2027.  Triage based on Pap results.  Otherwise follow-up 1 year        Shivam Dean MD 06/05/24 3:58 PM         Electronically signed by Shivam Dean MD at 6/5/2024  4:05 PM    Here for annual exam.  Working proton therapy at .  She is in better shape than she was a year ago when she had some genetic testing.  Since then she is received a cardiac echo.  It was very reassuring next cardiac echo with   Adal bobby in 5 years    She does have a Kyleena in place light irregular bleeding.  Kyleena good till 2027.  Does have a history of mildly abnormal Paps that we have done colposcopy.  Pap smear was obtained today    On exam slim  female breast abdominal pelvic exams normal IUD thread seen Pap smear obtained.  Reassured her of that if Pap is abnormal and we call her for colposcopy it only needs treatment if there is high-grade changes          Review of Systems   All other systems reviewed and are negative.      Objective   Physical Exam  Constitutional:       Appearance: Normal appearance. She is normal weight.   Neurological:      Mental Status: She is alert.         Assessment/Plan   Well woman exam.  Kyleena good till 2027.  Not planning children.  History of mildly abnormal Paps Pap obtained.  Will triage based on results.  Start mammography at age 39         Shivam Dean MD 07/02/25 3:45 PM

## 2025-07-18 LAB
CYTOLOGY CMNT CVX/VAG CYTO-IMP: NORMAL
HPV HR 12 DNA GENITAL QL NAA+PROBE: POSITIVE
HPV HR GENOTYPES PNL CVX NAA+PROBE: POSITIVE
HPV16 DNA SPEC QL NAA+PROBE: NEGATIVE
HPV18 DNA SPEC QL NAA+PROBE: NEGATIVE
LAB AP CONTRACEPTIVE HISTORY: NORMAL
LAB AP HPV GENOTYPE QUESTION: YES
LAB AP HPV HR: NORMAL
LABORATORY COMMENT REPORT: NORMAL
PATH REPORT.TOTAL CANCER: NORMAL

## 2025-08-22 ENCOUNTER — HOSPITAL ENCOUNTER (EMERGENCY)
Facility: HOSPITAL | Age: 35
Discharge: HOME | End: 2025-08-22
Attending: STUDENT IN AN ORGANIZED HEALTH CARE EDUCATION/TRAINING PROGRAM
Payer: COMMERCIAL

## 2025-08-22 ENCOUNTER — APPOINTMENT (OUTPATIENT)
Dept: RADIOLOGY | Facility: HOSPITAL | Age: 35
End: 2025-08-22
Payer: COMMERCIAL

## 2025-08-22 VITALS
HEART RATE: 91 BPM | TEMPERATURE: 99.9 F | RESPIRATION RATE: 16 BRPM | OXYGEN SATURATION: 96 % | WEIGHT: 127 LBS | HEIGHT: 65 IN | DIASTOLIC BLOOD PRESSURE: 59 MMHG | BODY MASS INDEX: 21.16 KG/M2 | SYSTOLIC BLOOD PRESSURE: 120 MMHG

## 2025-08-22 DIAGNOSIS — R10.13 EPIGASTRIC PAIN: Primary | ICD-10-CM

## 2025-08-22 LAB
ALBUMIN SERPL BCP-MCNC: 4.6 G/DL (ref 3.4–5)
ALP SERPL-CCNC: 45 U/L (ref 33–110)
ALT SERPL W P-5'-P-CCNC: 11 U/L (ref 7–45)
ANION GAP SERPL CALC-SCNC: 13 MMOL/L (ref 10–20)
APPEARANCE UR: CLEAR
AST SERPL W P-5'-P-CCNC: 14 U/L (ref 9–39)
BACTERIA #/AREA URNS AUTO: ABNORMAL /HPF
BASOPHILS # BLD AUTO: 0.05 X10*3/UL (ref 0–0.1)
BASOPHILS NFR BLD AUTO: 0.7 %
BILIRUB SERPL-MCNC: 0.4 MG/DL (ref 0–1.2)
BILIRUB UR STRIP.AUTO-MCNC: NEGATIVE MG/DL
BUN SERPL-MCNC: 17 MG/DL (ref 6–23)
CALCIUM SERPL-MCNC: 9.4 MG/DL (ref 8.6–10.3)
CHLORIDE SERPL-SCNC: 104 MMOL/L (ref 98–107)
CO2 SERPL-SCNC: 25 MMOL/L (ref 21–32)
COLOR UR: COLORLESS
CREAT SERPL-MCNC: 0.85 MG/DL (ref 0.5–1.05)
EGFRCR SERPLBLD CKD-EPI 2021: >90 ML/MIN/1.73M*2
EOSINOPHIL # BLD AUTO: 0.28 X10*3/UL (ref 0–0.7)
EOSINOPHIL NFR BLD AUTO: 4 %
ERYTHROCYTE [DISTWIDTH] IN BLOOD BY AUTOMATED COUNT: 12.5 % (ref 11.5–14.5)
GLUCOSE SERPL-MCNC: 98 MG/DL (ref 74–99)
GLUCOSE UR STRIP.AUTO-MCNC: NORMAL MG/DL
HCG UR QL IA.RAPID: NEGATIVE
HCT VFR BLD AUTO: 40.8 % (ref 36–46)
HGB BLD-MCNC: 14.3 G/DL (ref 12–16)
IMM GRANULOCYTES # BLD AUTO: 0.02 X10*3/UL (ref 0–0.7)
IMM GRANULOCYTES NFR BLD AUTO: 0.3 % (ref 0–0.9)
KETONES UR STRIP.AUTO-MCNC: NEGATIVE MG/DL
LEUKOCYTE ESTERASE UR QL STRIP.AUTO: ABNORMAL
LIPASE SERPL-CCNC: 42 U/L (ref 9–82)
LYMPHOCYTES # BLD AUTO: 1.79 X10*3/UL (ref 1.2–4.8)
LYMPHOCYTES NFR BLD AUTO: 25.5 %
MCH RBC QN AUTO: 31.2 PG (ref 26–34)
MCHC RBC AUTO-ENTMCNC: 35 G/DL (ref 32–36)
MCV RBC AUTO: 89 FL (ref 80–100)
MONOCYTES # BLD AUTO: 0.45 X10*3/UL (ref 0.1–1)
MONOCYTES NFR BLD AUTO: 6.4 %
NEUTROPHILS # BLD AUTO: 4.42 X10*3/UL (ref 1.2–7.7)
NEUTROPHILS NFR BLD AUTO: 63.1 %
NITRITE UR QL STRIP.AUTO: NEGATIVE
NRBC BLD-RTO: 0 /100 WBCS (ref 0–0)
PH UR STRIP.AUTO: 6 [PH]
PLATELET # BLD AUTO: 245 X10*3/UL (ref 150–450)
POTASSIUM SERPL-SCNC: 4.3 MMOL/L (ref 3.5–5.3)
PROT SERPL-MCNC: 6.8 G/DL (ref 6.4–8.2)
PROT UR STRIP.AUTO-MCNC: NEGATIVE MG/DL
RBC # BLD AUTO: 4.58 X10*6/UL (ref 4–5.2)
RBC # UR STRIP.AUTO: ABNORMAL MG/DL
RBC #/AREA URNS AUTO: ABNORMAL /HPF
SODIUM SERPL-SCNC: 138 MMOL/L (ref 136–145)
SP GR UR STRIP.AUTO: 1.01
UROBILINOGEN UR STRIP.AUTO-MCNC: NORMAL MG/DL
WBC # BLD AUTO: 7 X10*3/UL (ref 4.4–11.3)
WBC #/AREA URNS AUTO: ABNORMAL /HPF

## 2025-08-22 PROCEDURE — 71275 CT ANGIOGRAPHY CHEST: CPT | Performed by: RADIOLOGY

## 2025-08-22 PROCEDURE — 83690 ASSAY OF LIPASE: CPT | Performed by: STUDENT IN AN ORGANIZED HEALTH CARE EDUCATION/TRAINING PROGRAM

## 2025-08-22 PROCEDURE — 76856 US EXAM PELVIC COMPLETE: CPT | Performed by: RADIOLOGY

## 2025-08-22 PROCEDURE — 81001 URINALYSIS AUTO W/SCOPE: CPT | Performed by: STUDENT IN AN ORGANIZED HEALTH CARE EDUCATION/TRAINING PROGRAM

## 2025-08-22 PROCEDURE — 36415 COLL VENOUS BLD VENIPUNCTURE: CPT | Performed by: STUDENT IN AN ORGANIZED HEALTH CARE EDUCATION/TRAINING PROGRAM

## 2025-08-22 PROCEDURE — 96361 HYDRATE IV INFUSION ADD-ON: CPT

## 2025-08-22 PROCEDURE — 74177 CT ABD & PELVIS W/CONTRAST: CPT

## 2025-08-22 PROCEDURE — 87086 URINE CULTURE/COLONY COUNT: CPT | Mod: GEALAB | Performed by: STUDENT IN AN ORGANIZED HEALTH CARE EDUCATION/TRAINING PROGRAM

## 2025-08-22 PROCEDURE — 76856 US EXAM PELVIC COMPLETE: CPT

## 2025-08-22 PROCEDURE — 2500000004 HC RX 250 GENERAL PHARMACY W/ HCPCS (ALT 636 FOR OP/ED): Performed by: STUDENT IN AN ORGANIZED HEALTH CARE EDUCATION/TRAINING PROGRAM

## 2025-08-22 PROCEDURE — 74177 CT ABD & PELVIS W/CONTRAST: CPT | Performed by: RADIOLOGY

## 2025-08-22 PROCEDURE — 81025 URINE PREGNANCY TEST: CPT | Performed by: STUDENT IN AN ORGANIZED HEALTH CARE EDUCATION/TRAINING PROGRAM

## 2025-08-22 PROCEDURE — 76830 TRANSVAGINAL US NON-OB: CPT | Performed by: RADIOLOGY

## 2025-08-22 PROCEDURE — 96360 HYDRATION IV INFUSION INIT: CPT | Mod: 59

## 2025-08-22 PROCEDURE — 2550000001 HC RX 255 CONTRASTS: Performed by: STUDENT IN AN ORGANIZED HEALTH CARE EDUCATION/TRAINING PROGRAM

## 2025-08-22 PROCEDURE — 76705 ECHO EXAM OF ABDOMEN: CPT | Performed by: RADIOLOGY

## 2025-08-22 PROCEDURE — 71275 CT ANGIOGRAPHY CHEST: CPT

## 2025-08-22 PROCEDURE — 85025 COMPLETE CBC W/AUTO DIFF WBC: CPT | Performed by: STUDENT IN AN ORGANIZED HEALTH CARE EDUCATION/TRAINING PROGRAM

## 2025-08-22 PROCEDURE — 80053 COMPREHEN METABOLIC PANEL: CPT | Performed by: STUDENT IN AN ORGANIZED HEALTH CARE EDUCATION/TRAINING PROGRAM

## 2025-08-22 PROCEDURE — 76705 ECHO EXAM OF ABDOMEN: CPT

## 2025-08-22 PROCEDURE — 99285 EMERGENCY DEPT VISIT HI MDM: CPT | Mod: 25 | Performed by: STUDENT IN AN ORGANIZED HEALTH CARE EDUCATION/TRAINING PROGRAM

## 2025-08-22 RX ORDER — FAMOTIDINE 20 MG/1
20 TABLET, FILM COATED ORAL 2 TIMES DAILY
Qty: 30 TABLET | Refills: 0 | Status: SHIPPED | OUTPATIENT
Start: 2025-08-22 | End: 2025-09-06

## 2025-08-22 RX ORDER — SUCRALFATE 1 G/10ML
1 SUSPENSION ORAL 4 TIMES DAILY
Qty: 1200 ML | Refills: 0 | Status: SHIPPED | OUTPATIENT
Start: 2025-08-22 | End: 2025-08-27 | Stop reason: ALTCHOICE

## 2025-08-22 RX ADMIN — IOHEXOL 75 ML: 350 INJECTION, SOLUTION INTRAVENOUS at 19:43

## 2025-08-22 RX ADMIN — SODIUM CHLORIDE, SODIUM LACTATE, POTASSIUM CHLORIDE, AND CALCIUM CHLORIDE 1000 ML: .6; .31; .03; .02 INJECTION, SOLUTION INTRAVENOUS at 18:18

## 2025-08-22 ASSESSMENT — LIFESTYLE VARIABLES
EVER FELT BAD OR GUILTY ABOUT YOUR DRINKING: NO
TOTAL SCORE: 0
HAVE PEOPLE ANNOYED YOU BY CRITICIZING YOUR DRINKING: NO
HAVE YOU EVER FELT YOU SHOULD CUT DOWN ON YOUR DRINKING: NO
EVER HAD A DRINK FIRST THING IN THE MORNING TO STEADY YOUR NERVES TO GET RID OF A HANGOVER: NO

## 2025-08-22 ASSESSMENT — PAIN SCALES - GENERAL
PAINLEVEL_OUTOF10: 5 - MODERATE PAIN
PAINLEVEL_OUTOF10: 7

## 2025-08-22 ASSESSMENT — PAIN DESCRIPTION - ORIENTATION: ORIENTATION: MID

## 2025-08-22 ASSESSMENT — PAIN DESCRIPTION - DIRECTION: RADIATING_TOWARDS: RIGHT RIBS

## 2025-08-22 ASSESSMENT — PAIN DESCRIPTION - LOCATION: LOCATION: ABDOMEN

## 2025-08-22 ASSESSMENT — PAIN DESCRIPTION - DESCRIPTORS
DESCRIPTORS: CRAMPING;SHOOTING

## 2025-08-22 ASSESSMENT — PAIN - FUNCTIONAL ASSESSMENT: PAIN_FUNCTIONAL_ASSESSMENT: 0-10

## 2025-08-22 ASSESSMENT — PAIN DESCRIPTION - PAIN TYPE: TYPE: ACUTE PAIN

## 2025-08-22 ASSESSMENT — PAIN DESCRIPTION - FREQUENCY: FREQUENCY: INTERMITTENT

## 2025-08-24 LAB — BACTERIA UR CULT: NORMAL

## 2025-08-25 LAB — HOLD SPECIMEN: NORMAL

## 2025-08-27 ENCOUNTER — APPOINTMENT (OUTPATIENT)
Dept: OBSTETRICS AND GYNECOLOGY | Facility: CLINIC | Age: 35
End: 2025-08-27
Payer: COMMERCIAL

## 2025-08-27 VITALS
WEIGHT: 125 LBS | SYSTOLIC BLOOD PRESSURE: 112 MMHG | DIASTOLIC BLOOD PRESSURE: 60 MMHG | BODY MASS INDEX: 20.83 KG/M2 | HEIGHT: 65 IN

## 2025-08-27 DIAGNOSIS — R87.810 CERVICAL HIGH RISK HPV (HUMAN PAPILLOMAVIRUS) TEST POSITIVE: ICD-10-CM

## 2025-08-27 DIAGNOSIS — R87.612 LGSIL ON PAP SMEAR OF CERVIX: Primary | ICD-10-CM

## 2025-08-27 PROCEDURE — 57456 ENDOCERV CURETTAGE W/SCOPE: CPT | Performed by: OBSTETRICS & GYNECOLOGY

## 2025-10-09 ENCOUNTER — APPOINTMENT (OUTPATIENT)
Dept: GASTROENTEROLOGY | Facility: CLINIC | Age: 35
End: 2025-10-09
Payer: COMMERCIAL

## 2026-07-10 ENCOUNTER — APPOINTMENT (OUTPATIENT)
Dept: OBSTETRICS AND GYNECOLOGY | Facility: CLINIC | Age: 36
End: 2026-07-10
Payer: COMMERCIAL